# Patient Record
Sex: FEMALE | NOT HISPANIC OR LATINO | URBAN - NONMETROPOLITAN AREA
[De-identification: names, ages, dates, MRNs, and addresses within clinical notes are randomized per-mention and may not be internally consistent; named-entity substitution may affect disease eponyms.]

---

## 2019-12-12 ENCOUNTER — APPOINTMENT (RX ONLY)
Dept: URBAN - NONMETROPOLITAN AREA CLINIC 29 | Facility: CLINIC | Age: 54
Setting detail: DERMATOLOGY
End: 2019-12-12

## 2019-12-12 VITALS — DIASTOLIC BLOOD PRESSURE: 72 MMHG | SYSTOLIC BLOOD PRESSURE: 120 MMHG

## 2019-12-12 DIAGNOSIS — L81.4 OTHER MELANIN HYPERPIGMENTATION: ICD-10-CM

## 2019-12-12 DIAGNOSIS — D18.0 HEMANGIOMA: ICD-10-CM

## 2019-12-12 DIAGNOSIS — Z71.89 OTHER SPECIFIED COUNSELING: ICD-10-CM

## 2019-12-12 DIAGNOSIS — D22 MELANOCYTIC NEVI: ICD-10-CM

## 2019-12-12 DIAGNOSIS — L57.8 OTHER SKIN CHANGES DUE TO CHRONIC EXPOSURE TO NONIONIZING RADIATION: ICD-10-CM

## 2019-12-12 PROBLEM — D22.72 MELANOCYTIC NEVI OF LEFT LOWER LIMB, INCLUDING HIP: Status: ACTIVE | Noted: 2019-12-12

## 2019-12-12 PROBLEM — D22.5 MELANOCYTIC NEVI OF TRUNK: Status: ACTIVE | Noted: 2019-12-12

## 2019-12-12 PROBLEM — E03.9 HYPOTHYROIDISM, UNSPECIFIED: Status: ACTIVE | Noted: 2019-12-12

## 2019-12-12 PROBLEM — D22.39 MELANOCYTIC NEVI OF OTHER PARTS OF FACE: Status: ACTIVE | Noted: 2019-12-12

## 2019-12-12 PROBLEM — D18.01 HEMANGIOMA OF SKIN AND SUBCUTANEOUS TISSUE: Status: ACTIVE | Noted: 2019-12-12

## 2019-12-12 PROCEDURE — ? IN-HOUSE DISPENSING PHARMACY

## 2019-12-12 PROCEDURE — 99214 OFFICE O/P EST MOD 30 MIN: CPT

## 2019-12-12 PROCEDURE — ? COUNSELING

## 2019-12-12 PROCEDURE — ? SUNSCREEN RECOMMENDATIONS

## 2019-12-12 ASSESSMENT — LOCATION DETAILED DESCRIPTION DERM
LOCATION DETAILED: RIGHT SUPERIOR LATERAL FOREHEAD
LOCATION DETAILED: EPIGASTRIC SKIN
LOCATION DETAILED: RIGHT CLAVICULAR SKIN
LOCATION DETAILED: INFERIOR THORACIC SPINE
LOCATION DETAILED: LEFT INFERIOR LATERAL FOREHEAD
LOCATION DETAILED: LEFT RIB CAGE
LOCATION DETAILED: LEFT DORSAL FOOT

## 2019-12-12 ASSESSMENT — LOCATION ZONE DERM
LOCATION ZONE: FACE
LOCATION ZONE: FEET
LOCATION ZONE: TRUNK

## 2019-12-12 ASSESSMENT — LOCATION SIMPLE DESCRIPTION DERM
LOCATION SIMPLE: LEFT FOOT
LOCATION SIMPLE: UPPER BACK
LOCATION SIMPLE: RIGHT FOREHEAD
LOCATION SIMPLE: ABDOMEN
LOCATION SIMPLE: LEFT FOREHEAD
LOCATION SIMPLE: RIGHT CLAVICULAR SKIN

## 2019-12-12 NOTE — PROCEDURE: IN-HOUSE DISPENSING PHARMACY
Product 50 Unit Type: mg
Product 49 Price/Unit (In Dollars): 0
Product 45 Refills: 3
Name Of Product 31: Urea 40% cream
Send Charges To Patient Encounter: Yes
Product 5 Price/Unit (In Dollars): 45.00
Product 42 Amount/Unit (Numbers Only): 30
Product 41 Application Directions: Apply to spot on nose once daily for 2 weeks.
Product 27 Unit Type: grams
Product 22 Amount/Unit (Numbers Only): 60
Detail Level: Simple
Name Of Product 44: Anti-aging Body Tretinoin 0.05% Cream
Name Of Product 11: Metronidazole Gel
Product 25 Price/Unit (In Dollars): 40.00
Product 1 Amount/Unit (Numbers Only): 120
Product 23 Application Directions: Apply BID to rash on body for two weeks
Name Of Product 25: Fluocinonide Cream
Name Of Product 27: Hydrocortisone 2.5% / Tranilast 0.5% / Levo 2%
Product 32 Application Directions: Apply twice daily to affected areas.
Product 26 Application Directions: Apply to rash BID for 4 weeks.
Product 1 Unit Type: ml
Product 41 Price/Unit (In Dollars): 50.00
Product 29 Price/Unit (In Dollars): 65.00
Name Of Product 33: Seborrheic Dermatitis Shampoo   120ml
Name Of Product 45: Skin brightening hydroquinone 8% combination sensitive skin
Name Of Product 5: BP 8% Acne Wash
Product 9 Application Directions: Apply to chest, shoulder and back once before bed time
Name Of Product 10: Female Hormonal Acne Gel
Product 8 Application Directions: Apply a pea size amount once nightly every other night for 3 weeks, then working up to [facility]
Product 44 Amount/Unit (Numbers Only): 240
Name Of Product 26: Ketoconazole/ hydrocortisone cream
Name Of Product 9: Tretinoin 0.025% / Clindamycin Combination Cream
Product 44 Price/Unit (In Dollars): 90.00
Name Of Product 3: Adapalene 0.3% Gel
Product 23 Refills: 4
Product 41 Refills: 1
Name Of Product 22: Clobetasol Cream
Name Of Product 1: Sodium sulfacetamide 8%
Name Of Product 29: Triamcinolone Cream
Product 7 Application Directions: Apply every morning.
Product 1 Application Directions: Use once daily and rinse off.
Name Of Product 23: Clobetasol Ointment
Name Of Product 43: Alopecia Topical Solution for Men
Product 10 Application Directions: Apply topically to entire face once a day to start then gradually increase to twice a day
Product 42 Application Directions: Apply to dark spots BID daily.
Name Of Product 41: AK Imiquimod Gel
Product 22 Application Directions: Apply BID to affected areas
Product 4 Application Directions: Apply to affected area twice a day
Name Of Product 24: Fluocinolone Scalp and Body Oil
Name Of Product 12: Rosacea Triple Combination Gel
Name Of Product 2: Acne Triple Gel Combination
Name Of Product 8: Hydrating 0.05% Tretinoin Cream
Product 28 Price/Unit (In Dollars): 30.00
Product 1 Price/Unit (In Dollars): 35.00
Name Of Product 32: Tacrolimus 0.1% Ointment
Name Of Product 6: Clindamycin Gel
Name Of Product 36: Clobetasol solution
Name Of Product 42: Skin Brightening Hydroquinone 8% Combination
Name Of Product 4: BP 2.5% / Clindamycin Combination Gel
Name Of Product 7: Dapsone 6% Gel
Name Of Product 13: Rosacea Cream
Product 12 Application Directions: Apply to face once daily.
Name Of Product 21: Anti-Fungal Shampoo

## 2019-12-12 NOTE — PROCEDURE: SUNSCREEN RECOMMENDATIONS
Products Recommended: Hats, sun protective clothing, Sunscreen and reapply sunscreen. [employer]am[employer], John Paul Rosem
Detail Level: Generalized
General Sunscreen Counseling: I recommended a broad spectrum sunscreen with a SPF of 50 or higher.  I explained that SPF 50 sunscreens block approximately 97 percent of the sun's harmful rays.  Sunscreens should be applied at least 15 minutes prior to expected sun exposure and then every 2 hours after that as long as sun exposure continues. If swimming or exercising sunscreen should be reapplied every 45 minutes to an hour after getting wet or sweating.  One ounce, or the equivalent of a shot glass full of sunscreen, is adequate to protect the skin not covered by a bathing suit. I also recommended a lip balm with a sunscreen as well. Sun protective clothing can be used in lieu of sunscreen but must be worn the entire time you are exposed to the sun's rays.

## 2019-12-12 NOTE — PROCEDURE: MIPS QUALITY
Quality 110: Preventive Care And Screening: Influenza Immunization: Influenza Immunization Administered during Influenza season
Quality 265: Biopsy Follow-Up: Biopsy results reviewed, communicated, tracked, and documented
Quality 130: Documentation Of Current Medications In The Medical Record: Current Medications Documented
Quality 226: Preventive Care And Screening: Tobacco Use: Screening And Cessation Intervention: Tobacco Screening not Performed for Medical Reasons
Quality 111:Pneumonia Vaccination Status For Older Adults: Pneumococcal Vaccination not Administered or Previously Received, Reason not Otherwise Specified
Detail Level: Generalized